# Patient Record
Sex: FEMALE | Race: AMERICAN INDIAN OR ALASKA NATIVE
[De-identification: names, ages, dates, MRNs, and addresses within clinical notes are randomized per-mention and may not be internally consistent; named-entity substitution may affect disease eponyms.]

---

## 2020-02-20 NOTE — XRAY REPORT
RIGHT FOOT



HISTORY: Pain after fall.



COMPARISON: None.



TECHNIQUE: 3 views of the right foot were obtained.

 

FINDINGS:

Bones: No fracture or dislocation.

Joint spaces: Maintained. 

Soft tissues: Mild soft tissue swelling. No foreign body or soft tissue air.



Additional findings: None.



IMPRESSION:

1. No significant abnormality.



Signer Name: Jameel Hope MD 

Signed: 2/20/2020 4:13 PM

 Workstation Name: [id number]

## 2020-02-20 NOTE — XRAY REPORT
RIGHT ANKLE 3 VIEWS



INDICATION:  pain s/p fall. 



COMPARISON: None.



IMPRESSION:  No acute osseous or soft tissue abnormality.    No significant DJD.



Signer Name: Killian Latif Jr, MD 

Signed: 2/20/2020 4:14 PM

 Workstation Name: [id number]

## 2020-02-20 NOTE — EMERGENCY DEPARTMENT REPORT
Blank Doc





- Documentation


Documentation: 





40-year-old female that presents with right ankle and foot pain s/p fall.





This initial assessment/diagnostic orders/clinical plan/treatment(s) is/are 

subject to change based on patient's health status, clinical progression and re-

assessment by fellow clinical providers in the ED.  Further treatment and workup

at subsequent clinical providers discretion.  Patient/guardians urged not to 

elope from the ED as their condition may be serious if not clinically assessed 

and managed.  Initial orders include:


1- Patient sent to [facility] for further evaluation and treatment


2- xrays

## 2020-08-26 ENCOUNTER — HOSPITAL ENCOUNTER (EMERGENCY)
Dept: HOSPITAL 5 - ED | Age: 40
LOS: 1 days | Discharge: HOME | End: 2020-08-27
Payer: SELF-PAY

## 2020-08-26 VITALS — DIASTOLIC BLOOD PRESSURE: 64 MMHG | SYSTOLIC BLOOD PRESSURE: 137 MMHG
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
## 2020-08-26 DIAGNOSIS — Z79.899: ICD-10-CM

## 2020-08-26 DIAGNOSIS — J45.909: ICD-10-CM

## 2020-08-26 DIAGNOSIS — F31.9: ICD-10-CM

## 2020-08-26 DIAGNOSIS — Z88.8: ICD-10-CM

## 2020-08-26 DIAGNOSIS — Z98.890: ICD-10-CM

## 2020-08-26 DIAGNOSIS — T63.441A: Primary | ICD-10-CM

## 2020-08-26 PROCEDURE — 99282 EMERGENCY DEPT VISIT SF MDM: CPT

## 2020-08-26 PROCEDURE — 96374 THER/PROPH/DIAG INJ IV PUSH: CPT

## 2020-08-26 PROCEDURE — 96375 TX/PRO/DX INJ NEW DRUG ADDON: CPT

## 2020-08-26 NOTE — EMERGENCY DEPARTMENT REPORT
ED Allergic Reaction HPI





- General


Chief complaint: Allergic Reaction


Stated complaint: INSECT STING


Time Seen by Provider: 08/26/20 22:35


Source: patient


Mode of arrival: Ambulatory


Limitations: No Limitations





- History of Present Illness


Initial Comments: 





Patient is a 40-year-old female presents emergency room with complaints of an 

allergic reaction that occurred today at the 4:30 PM.  Patient states that she 

was stung by a hornet to the abdomen.  She states that she began to feel itching

and broke out in hives on the abdomen.  Patient states that she took 2 Benadryl 

immediately after being stung.  She denies any facial swelling, tongue swelling,

difficulty swallowing, throat swelling, shortness of breath, difficulty 

breathing.  She has a past medical history of COPD.  She has an allergy to 

penicillin, berries, and bee stings.  Patient states that she has had 

anaphylaxis in the past due to bee stings.  She does not have an EpiPen.





- Related Data


                                  Previous Rx's











 Medication  Instructions  Recorded  Last Taken  Type


 


Acetaminophen [Non-Aspirin Extra 500 mg PO Q6HR PRN #30 tablet 06/15/20 Unknown 

Rx





Strength]    


 


Ibuprofen [Motrin] 600 mg PO Q8H PRN #30 tablet 06/15/20 Unknown Rx


 


Morphine Sulfate [Morphine Sulfate 7.5 mg PO Q6HR PRN #10 tablet 06/15/20 

Unknown Rx





IR]    


 


EPINEPHrine [Epipen 2-Sergei] 0.3 mg IJ ONCE PRN #1 applicator 08/27/20 Unknown Rx


 


Famotidine [Pepcid] 40 mg PO QHS #10 tablet 08/27/20 Unknown Rx


 


Prednisone [predniSONE 10 mg 10 mg PO .TAPER #1 tab.ds.pk 08/27/20 Unknown Rx





(6-Day Pack, 21 Tabs)]    


 


diphenhydrAMINE [Benadryl CAP] 50 mg PO Q8HR PRN #14 capsule 08/27/20 Unknown Rx











                                    Allergies











Allergy/AdvReac Type Severity Reaction Status Date / Time


 


ampicillin Allergy  Anaphylaxis Verified 02/20/20 14:35


 


bee pollen Allergy  Anaphylaxis Verified 02/20/20 14:35


 


ALL BERRIES Allergy  Anaphylaxis Uncoded 02/20/20 14:35














ED Review of Systems


ROS: 


Stated complaint: INSECT STING


Other details as noted in HPI





Comment: All other systems reviewed and negative





ED Past Medical Hx





- Past Medical History


Hx Psychiatric Treatment: Yes (BIPOLAR DEPRESSION)


Hx Asthma: Yes


Additional medical history: LYMPODEMA





- Surgical History


Additional Surgical History: HERNIA X3 C SECTION/ HYSTO/ 2 LAPS





- Social History


Smoking Status: Never Smoker





- Medications


Home Medications: 


                                Home Medications











 Medication  Instructions  Recorded  Confirmed  Last Taken  Type


 


Acetaminophen [Non-Aspirin Extra 500 mg PO Q6HR PRN #30 tablet 06/15/20  Unknown

 Rx





Strength]     


 


Ibuprofen [Motrin] 600 mg PO Q8H PRN #30 tablet 06/15/20  Unknown Rx


 


Morphine Sulfate [Morphine Sulfate 7.5 mg PO Q6HR PRN #10 tablet 06/15/20  

Unknown Rx





IR]     


 


EPINEPHrine [Epipen 2-Sergei] 0.3 mg IJ ONCE PRN #1 applicator 08/27/20  Unknown Rx


 


Famotidine [Pepcid] 40 mg PO QHS #10 tablet 08/27/20  Unknown Rx


 


Prednisone [predniSONE 10 mg 10 mg PO .TAPER #1 tab.ds.pk 08/27/20  Unknown Rx





(6-Day Pack, 21 Tabs)]     


 


diphenhydrAMINE [Benadryl CAP] 50 mg PO Q8HR PRN #14 capsule 08/27/20  Unknown 

Rx














ED Physical Exam





- General


Limitations: No Limitations


General appearance: alert, in no apparent distress





- Head


Head exam: Present: atraumatic, normocephalic, other (no angioedema)





- Eye


Eye exam: Present: normal appearance





- ENT


ENT exam: Present: normal orophraynx, mucous membranes moist, other (no tongue 

edema, uvula is midline, no uvular deviation or edema )





- Respiratory


Respiratory exam: Present: normal lung sounds bilaterally.  Absent: respiratory 

distress, wheezes, rales, rhonchi, stridor, chest wall tenderness, accessory 

muscle use, decreased breath sounds, prolonged expiratory





- Cardiovascular


Cardiovascular Exam: Present: regular rate, normal rhythm, normal heart sounds. 

Absent: systolic murmur, diastolic murmur, rubs, gallop





- Neurological Exam


Neurological exam: Present: alert, oriented X3





- Psychiatric


Psychiatric exam: Present: normal affect, normal mood





- Skin


Skin exam: Present: warm, dry, other (there is localized erythema present to the

right side of the abdomen, no obvious urticaria, no rash present to the rest of 

the body)





ED Course


                                   Vital Signs











  08/26/20





  18:08


 


Temperature 98.9 F


 


Pulse Rate 84


 


Respiratory 18





Rate 


 


Blood Pressure 137/64


 


O2 Sat by Pulse 98





Oximetry 














ED Medical Decision Making





- Medical Decision Making





Patient is a 40-year-old female presents emergency room with complaints of an 

allergic reaction that occurred today at the 4:30 PM.  Patient states that she 

was stung by a hornet to the abdomen.  She states that she began to feel itching

and broke out in hives on the abdomen.  Patient states that she took 2 Benadryl 

immediately after being stung.  She denies any facial swelling, tongue swelling,

difficulty swallowing, throat swelling, shortness of breath, difficulty 

breathing.  She has a past medical history of COPD.  She has an allergy to 

penicillin, berries, and bee stings.  Patient states that she has had 

anaphylaxis in the past due to bee stings.  She does not have an EpiPen. VSS. on

exam: there is localized erythema present to the right side of the abdomen, no 

obvious urticaria, no rash present to the rest of the body, no angioedema, no 

tongue edema, uvula is midline, no uvular edema or deviation no stridor, breath 

sounds are clear bilaterally.  Examination appears consistent with localized 

allergic reaction.  No signs of significant angioedema or anaphylaxis at this 

time.  Patient given Benadryl, Pepcid, Decadron.  On reexamination erythema of 

the abdomen has significantly improved.  Patient is feeling much better and 

ready to go home.  Patient was observed in the emergency department for a couple

of hours.  Patient given prescription for prednisone taper, Benadryl, Pepcid, 

EpiPen.  Advised patient Please use medication as prescribed.  If you ever get 

stung and began to have facial swelling, difficulty breathing, sensation of 

throat closing, please use EpiPen and then immediately present to the emergency 

room or call 911.  Follow-up with a primary care doctor.  Return to emergency 

room for any new or worsening symptoms.


Critical care attestation.: 


If time is entered above; I have spent that time in minutes in the direct care 

of this critically ill patient, excluding procedure time.








ED Disposition


Clinical Impression: 


Acute allergic reaction


Qualifiers:


 Encounter type: initial encounter Qualified Code(s): T78.40XA - Allergy, 

unspecified, initial encounter





Disposition: DC-01 TO HOME OR SELFCARE


Is pt being admited?: No


Does the pt Need Aspirin: No


Condition: Stable


Instructions:  Urticaria (ED), Allergies (ED)


Additional Instructions: 


Please use medication as prescribed.  If you ever get stung and began to have 

facial swelling, difficulty breathing, sensation of throat closing, please use 

EpiPen and then immediately present to the emergency room or call 911.  Follow-

up with a primary care doctor.  Return to emergency room for any new or 

worsening symptoms.


Prescriptions: 


Famotidine [Pepcid] 40 mg PO QHS #10 tablet


diphenhydrAMINE [Benadryl CAP] 50 mg PO Q8HR PRN #14 capsule


 PRN Reason: itching


EPINEPHrine [Epipen 2-Sergei] 0.3 mg IJ ONCE PRN #1 applicator


 PRN Reason: Anaphylaxis


Prednisone [predniSONE 10 mg (6-Day Pack, 21 Tabs)] 10 mg PO .TAPER #1 tab.ds.pk


Referrals: 


IRIS CERDA MD [Staff Physician] - 2-3 Days


[facility] [Provider Group] - 2-3 Days


[facility] [Outside] - 2-3 Days


Time of Disposition: 00:13


Print Language: ENGLISH

## 2020-11-03 ENCOUNTER — HOSPITAL ENCOUNTER (EMERGENCY)
Dept: HOSPITAL 5 - ED | Age: 40
Discharge: HOME | End: 2020-11-03
Payer: SELF-PAY

## 2020-11-03 VITALS — SYSTOLIC BLOOD PRESSURE: 120 MMHG | DIASTOLIC BLOOD PRESSURE: 84 MMHG

## 2020-11-03 DIAGNOSIS — Z79.899: ICD-10-CM

## 2020-11-03 DIAGNOSIS — Z88.0: ICD-10-CM

## 2020-11-03 DIAGNOSIS — Z91.018: ICD-10-CM
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
## 2020-11-03 DIAGNOSIS — Z91.030: ICD-10-CM

## 2020-11-03 DIAGNOSIS — F31.9: ICD-10-CM

## 2020-11-03 DIAGNOSIS — J45.909: ICD-10-CM

## 2020-11-03 DIAGNOSIS — T78.40XA: Primary | ICD-10-CM

## 2020-11-03 DIAGNOSIS — Z98.890: ICD-10-CM

## 2020-11-03 PROCEDURE — 96375 TX/PRO/DX INJ NEW DRUG ADDON: CPT

## 2020-11-03 PROCEDURE — 99282 EMERGENCY DEPT VISIT SF MDM: CPT

## 2020-11-03 PROCEDURE — 96374 THER/PROPH/DIAG INJ IV PUSH: CPT

## 2020-11-03 NOTE — EVENT NOTE
ED Screening Note


ED Screening Note: 





believes she got a yellow jacket sting about two hours ago 


states she was stung to the left hand 


states she has itching to the arms and face


no signs of rash 


pmhx asthma, chronic bronchitis, bipolar, ptsd 


allergies berries and insect stings


PSHx hysterectomy 





This initial assessment/diagnostic orders/clinical plan/treatment(s) is/are 

subject to change based on patients health status, clinical progression and re-

assessment by fellow clinical providers in the ED. Further treatment and workup 

at subsequent clinical providers discretion. Patient/guardian urged not to elope

from the ED as their condition may be serious if not clinically assessed and 

managed. 





Initial orders include: 


meds 


acc eval

## 2020-11-03 NOTE — EMERGENCY DEPARTMENT REPORT
ED Allergic Reaction HPI





- General


Chief complaint: Allergic Reaction


Stated complaint: ALLERGIC REACTION


Time Seen by Provider: 11/03/20 14:04


Source: patient


Mode of arrival: Ambulatory


Limitations: No Limitations





- History of Present Illness


Initial Comments: 





40-year-old female with a known history of bee and wasp allergies reports being 

stung in the left hand by a yellow jacket which she threw away from her not 

complains of burning tingling and a sensation of swelling to the hand.  Reports 

no wheezing, no shortness of breath no odynophagia or dysphagia but does have 

some tingling to the lips.  Reports no chest pain palpitation or shortness of 

breath no fever chills or sweats.  No rash but does feel some pruritus to her 

upper extremity.


MD Complaint: allergic reaction


-: Sudden


Severity: mild


Treatment Prior to Arrival: none


Previous Allergy History: none





- Related Data


                                  Previous Rx's











 Medication  Instructions  Recorded  Last Taken  Type


 


Acetaminophen [Non-Aspirin Extra 500 mg PO Q6HR PRN #30 tablet 06/15/20 Unknown 

Rx





Strength]    


 


Ibuprofen [Motrin] 600 mg PO Q8H PRN #30 tablet 06/15/20 Unknown Rx


 


Morphine Sulfate [Morphine Sulfate 7.5 mg PO Q6HR PRN #10 tablet 06/15/20 

Unknown Rx





IR]    


 


EPINEPHrine [Epipen 2-Sergei] 0.3 mg IJ ONCE PRN #1 applicator 08/27/20 Unknown Rx


 


Famotidine [Pepcid] 40 mg PO QHS #10 tablet 08/27/20 Unknown Rx


 


Prednisone [predniSONE 10 mg 10 mg PO .TAPER #1 tab.ds.pk 08/27/20 Unknown Rx





(6-Day Pack, 21 Tabs)]    


 


diphenhydrAMINE [Benadryl CAP] 50 mg PO Q8HR PRN #14 capsule 08/27/20 Unknown Rx


 


EPINEPHrine [Epipen] 0.3 mg IJ ONCE #1 auto.injct 11/03/20 Unknown Rx


 


hydrOXYzine HCL [Atarax] 25 mg PO Q6HR PRN #20 tablet 11/03/20 Unknown Rx


 


predniSONE [Deltasone] 50 mg PO QDAY #5 tab 11/03/20 Unknown Rx











                                    Allergies











Allergy/AdvReac Type Severity Reaction Status Date / Time


 


ampicillin Allergy  Anaphylaxis Verified 02/20/20 14:35


 


bee pollen Allergy  Anaphylaxis Verified 02/20/20 14:35


 


ALL BERRIES Allergy  Anaphylaxis Uncoded 02/20/20 14:35














ED Review of Systems


ROS: 


Stated complaint: ALLERGIC REACTION


Other details as noted in HPI





Comment: All other systems reviewed and negative





ED Past Medical Hx





- Past Medical History


Hx Psychiatric Treatment: Yes (BIPOLAR DEPRESSION)


Hx Asthma: Yes


Additional medical history: LYMPODEMA





- Surgical History


Additional Surgical History: HERNIA X3 C SECTION/ HYSTO/ 2 LAPS





- Social History


Smoking Status: Never Smoker


Substance Use Type: Alcohol





- Medications


Home Medications: 


                                Home Medications











 Medication  Instructions  Recorded  Confirmed  Last Taken  Type


 


Acetaminophen [Non-Aspirin Extra 500 mg PO Q6HR PRN #30 tablet 06/15/20  Unknown

 Rx





Strength]     


 


Ibuprofen [Motrin] 600 mg PO Q8H PRN #30 tablet 06/15/20  Unknown Rx


 


Morphine Sulfate [Morphine Sulfate 7.5 mg PO Q6HR PRN #10 tablet 06/15/20  U

nknown Rx





IR]     


 


EPINEPHrine [Epipen 2-Sergei] 0.3 mg IJ ONCE PRN #1 applicator 08/27/20  Unknown Rx


 


Famotidine [Pepcid] 40 mg PO QHS #10 tablet 08/27/20  Unknown Rx


 


Prednisone [predniSONE 10 mg 10 mg PO .TAPER #1 tab.ds.pk 08/27/20  Unknown Rx





(6-Day Pack, 21 Tabs)]     


 


diphenhydrAMINE [Benadryl CAP] 50 mg PO Q8HR PRN #14 capsule 08/27/20  Unknown 

Rx


 


EPINEPHrine [Epipen] 0.3 mg IJ ONCE #1 auto.injct 11/03/20  Unknown Rx


 


hydrOXYzine HCL [Atarax] 25 mg PO Q6HR PRN #20 tablet 11/03/20  Unknown Rx


 


predniSONE [Deltasone] 50 mg PO QDAY #5 tab 11/03/20  Unknown Rx














ED Physical Exam





- General


Limitations: No Limitations


General appearance: alert, in no apparent distress





- Head


Head exam: Present: atraumatic, normocephalic





- Eye


Eye exam: Present: normal appearance, PERRL, EOMI





- ENT


ENT exam: Present: normal exam, mucous membranes moist, other (Neck is supple 

airway patent tongue uvula midline)





- Neck


Neck exam: Present: normal inspection, full ROM





- Respiratory


Respiratory exam: Present: normal lung sounds bilaterally.  Absent: respiratory 

distress, wheezes, rales, chest wall tenderness, accessory muscle use





- Cardiovascular


Cardiovascular Exam: Present: regular rate, normal rhythm.  Absent: systolic 

murmur, diastolic murmur, rubs, gallop





- GI/Abdominal


GI/Abdominal exam: Present: soft, normal bowel sounds





- Extremities Exam


Extremities exam: Present: normal inspection





- Back Exam


Back exam: Present: normal inspection





- Neurological Exam


Neurological exam: Present: alert, oriented X3





- Psychiatric


Psychiatric exam: Present: normal affect, normal mood





- Skin


Skin exam: Present: warm, dry, intact, normal color.  Absent: rash





ED Course





                                   Vital Signs











  11/03/20





  14:00


 


Temperature 98 F


 


Pulse Rate 66


 


Respiratory 18





Rate 


 


Blood Pressure 120/84


 


O2 Sat by Pulse 97





Oximetry 














ED Medical Decision Making





- Medical Decision Making





This patient presents with symptoms consistent with acute hypersensitivity 

reaction, likely acute allergic reaction.  Presentation not consistent with 

acute anaphylaxis (lack of pulmonary, dermatologic, cardiovascular or GI 

symptoms, lack of hypotension or exposure to known allergen), angioedema, serum 

sickness(no recent drug exposure, lack of fevers, arthralgias), ingestion of 

preformed toxin.  No evidence of airway compromise or shock at this time.  Plan 

to treat for allergic reaction with H2/H1 blockers, steroids.  No indication for

 epinephrine at this time.





Plan steroids antihistamines and observation


Critical care attestation.: 


If time is entered above; I have spent that time in minutes in the direct care 

of this critically ill patient, excluding procedure time.








ED Disposition


Clinical Impression: 


 Allergic reaction, Bee sting allergy





Disposition: DC-01 TO HOME OR SELFCARE


Is pt being admited?: No


Does the pt Need Aspirin: No


Condition: Stable


Instructions:  Anaphylaxis (ED), Allergies (ED), Insect Bite or Sting (ED)


Referrals: 


PRIMARY CARE,MD [Primary Care Provider] - 3-5 Days


IRIS CERDA MD [Staff Physician] - 3-5 Days